# Patient Record
(demographics unavailable — no encounter records)

---

## 2019-08-05 NOTE — ED ABDOMINAL PAIN
General


Chief Complaint:  Trauma-Non Activation


Stated Complaint:  LOW ABD/ GROIN PAIN, HEADACHE


Source of Information:  Patient, RN Notes Reviewed


Exam Limitations:  No Limitations





History of Present Illness


Date Seen by Provider:  Aug 5, 2019


Time Seen by Provider:  18:18


Initial Comments


Patient presents c/ c/o worsening left groin pain and swelling p/ tripping and 

falling on 8/1/19.  States she landed on a printer in her left groin area.  Pain

worse c/ movement, palpation, & walking.  Has now developed some bruising and 

fullness in the area now as well.  Only other injury is to her left great toe 

that is painful.


Timing/Duration:  4-5 Days


Severity/Quality:  Moderate (5/10), Aching


Location:  Other (left groin)


Radiation:  No Radiation


Activities at Onset:  Other (fall)


Modifying Factors:  Worsens With Movement, Worsens With Palpation; Improves With

Resting


Associated Symptoms:  Denies Symptoms (x/ as noted. ), Headache, Swelling/Mass 

in Abdomen (left groin)





Allergies and Home Medications


Allergies


Coded Allergies:  


     Iodinated Contrast- Oral and IV Dye (Verified  Allergy, Severe, angioedema,

8/5/19)


     Penicillins (Verified  Allergy, Unknown, 8/5/19)


     erythromycin base (Verified  Adverse Reaction, Intermediate, nausea and 

vomiting, 8/5/19)


     NSAIDS (Non-Steroidal Anti-Inflamma (Verified  Adverse Reaction, Unknown, 

GI bleed, 8/5/19)





Home Medications


Methylprednisolone 4 Mg Tab.ds.pk, 4 MG PO UD


   PER DOSE PACK INSTRUCTIONS 


   Prescribed by: CANDACE COSTA on 8/5/19 2100





Patient Home Medication List


Home Medication List Reviewed:  Yes





Review of Systems


Review of Systems


Constitutional:  see HPI


Genitourinary:  See HPI, Pain (& bruising left groin area)


Musculoskeletal:  see HPI, other (left toe pain)





All Other Systems Reviewed


Negative Unless Noted:  Yes (Negative excepted noted.)





Past Medical-Social-Family Hx


Patient Social History


Recent Foreign Travel:  No


Contact w/Someone Who Travel:  No





Physical Exam


Vital Signs





Vital Signs - First Documented








 8/5/19





 18:10


 


Temp 98.4


 


Pulse 114


 


Resp 20


 


B/P (MAP) 138/94 (109)


 


Pulse Ox 100


 


O2 Delivery Room Air





Capillary Refill :


Height/Weight/BMI


Height: '"


Weight: lbs. oz. kg;  BMI


Method:


General Appearance:  WD/WN, no apparent distress


Respiratory:  no respiratory distress


Cardiovascular:  tachycardia


Rectal:  deferred


Genital/Rectal:  tenderness (left groin/Mons pubis)


Extremities:  other (tenderness/ pain left great toe although no obvious 

brusing, deformity, or swelling.)


Neurologic/Psychiatric:  no motor/sensory deficits, alert, normal mood/affect, 

oriented x 3


Skin:  warm/dry, ecchymosis (left groin extending to her left Mons pubis.)





Progress/Results/Core Measures


Results/Orders


Lab Results





Laboratory Tests








Test


 8/5/19


18:10 Range/Units


 


 


Urine Color YELLOW   


 


Urine Clarity CLEAR   


 


Urine pH 6.5  5-9  


 


Urine Specific Gravity <1.005  1.016-1.022  


 


Urine Protein NEGATIVE  NEGATIVE  


 


Urine Glucose (UA) NEGATIVE  NEGATIVE  


 


Urine Ketones NEGATIVE  NEGATIVE  


 


Urine Nitrite NEGATIVE  NEGATIVE  


 


Urine Bilirubin NEGATIVE  NEGATIVE  


 


Urine Urobilinogen 0.2  NORMAL  MG/DL


 


Urine Leukocyte Esterase NEGATIVE  NEGATIVE  


 


Urine RBC (Auto) TRACE H NEGATIVE  


 


Urine RBC RARE   /HPF


 


Urine WBC NONE   /HPF


 


Urine Squamous Epithelial


Cells 2-5 


  /HPF





 


Urine Crystals NONE   /LPF


 


Urine Bacteria FEW H  /HPF


 


Urine Casts NONE   /LPF


 


Urine Mucus NEGATIVE   /LPF


 


Urine Culture Indicated NO   


 


Urine Pregnancy Test NEGATIVE  NEGATIVE  








My Orders





Orders - CANDACE COSTA DO


Ua Culture If Indicated (8/5/19 18:00)


Hcg,Qualitative Urine (8/5/19 18:00)


Ct Pelvis Wo (8/5/19 18:35)


Toe(S) (8/5/19 18:35)


Dexamethasone Injection (Decadron Inject (8/5/19 21:15)





Vital Signs/I&O











 8/5/19 8/5/19





 18:10 21:09


 


Temp 98.4 98.9


 


Pulse 114 89


 


Resp 20 18


 


B/P (MAP) 138/94 (109) 126/73 (90)


 


Pulse Ox 100 99


 


O2 Delivery Room Air Room Air











Diagnostic Imaging





   Diagonstic Imaging:  Xray, CT


   Plain Films/CT/US/NM/MRI:  pelvis ((+) contusion), other (great toe is 

unremarkable)





Departure


Impression





   Primary Impression:  


   Contusion of left groin


   Additional Impressions:  


   Sprain of left great toe


   Fall


Disposition:  01 HOME, SELF-CARE


Condition:  Stable





Departure-Patient Inst.


Decision time for Depature:  20:58


Referrals:  


[facility] [place]


Patient Instructions:  Contusion (DC), Toe Injury (DC)





Add. Discharge Instructions:  


RECOMMEND 1000 mg OF TYLENOL EVERY 6 HOURS AS NEEDED FOR PAIN.  DO NOT EXCEED 

4000 mg OF TYLENOL IN A 24 HOUR PERIOD.  All discharge instructions reviewed 

with patient and/or family. Voiced understanding.


Scripts


Methylprednisolone (Medrol) 4 Mg Tab.ds.pk


4 MG PO UD for 6 Days, #21 PKG


   PER DOSE PACK INSTRUCTIONS


   Prov: CANDACE COSTA DO         8/5/19











CANDACE COSTA DO             Aug 5, 2019 18:18

## 2019-08-05 NOTE — DIAGNOSTIC IMAGING REPORT
EXAMINATION: CT pelvis without contrast dated 08/05/2019.



TECHNIQUE: Multiple contiguous axial images were obtained through

the pelvis without the use of intravenous contrast.  Sagittal and

coronal reformations were performed. Auto Exposure Controls were

utilized during the CT exam to meet ALARA standards for radiation

dose reduction. 



INDICATION: Fell five days ago, having pain in the pelvis.



FINDINGS: 

There is diffuse abnormal fat stranding along the left groin but

incompletely imaged. This could be due to contusion given the

history of fall. No measurable hematoma is appreciated. The

visualized intrapelvic structures are unremarkable. Appendix is

unremarkable. The osseous structures demonstrate degenerative

findings with no acute osseous abnormalities, fractures, or

dislocations. However, this is a non-bone algorithm examination.

Visualized lower lumbosacral spine is grossly unremarkable.



IMPRESSION:

1. Nonspecific and incompletely imaged fat stranding within the

anterior soft tissues of the left groin, perhaps due to

contusion. An inflammatory or infectious etiology such as

cellulitis is also in the differential; correlate with physical

examination.

2. No obvious fracture is appreciated on this non-bone algorithm

evaluation. If there is concern for fracture, correlation with

radiographs and specific imaging of the osseous structures could

be performed as clinically indicated. 

3. Other findings as above.



Dictated by: 



  Dictated on workstation # [id number]

## 2019-08-05 NOTE — DIAGNOSTIC IMAGING REPORT
EXAMINATION: Left great toe radiographs, 3 views.



COMPARISON: None. 



HISTORY: 39-year-old female, fall 5 days ago. Left great toe

pain. 



FINDINGS: There is an os navicularis. There is no identified

acute fracture. There is no radiopaque foreign body. Joint spaces

are well-preserved.



IMPRESSION: 

1. No identified acute bony abnormality of the left great toe. 



Dictated by: 



  Dictated on workstation # [id number]